# Patient Record
Sex: FEMALE | Race: BLACK OR AFRICAN AMERICAN | NOT HISPANIC OR LATINO | URBAN - METROPOLITAN AREA
[De-identification: names, ages, dates, MRNs, and addresses within clinical notes are randomized per-mention and may not be internally consistent; named-entity substitution may affect disease eponyms.]

---

## 2024-02-28 ENCOUNTER — LAB REQUISITION (OUTPATIENT)
Dept: LAB | Facility: HOSPITAL | Age: 54
End: 2024-02-28
Payer: MEDICAID

## 2024-02-28 PROCEDURE — 88342 IMHCHEM/IMCYTCHM 1ST ANTB: CPT | Performed by: DENTIST

## 2024-02-28 PROCEDURE — 88305 TISSUE EXAM BY PATHOLOGIST: CPT

## 2024-02-28 PROCEDURE — 88305 TISSUE EXAM BY PATHOLOGIST: CPT | Performed by: DENTIST

## 2024-02-28 PROCEDURE — 88342 IMHCHEM/IMCYTCHM 1ST ANTB: CPT

## 2024-03-08 LAB
LAB AP ASR DISCLAIMER: NORMAL
LABORATORY COMMENT REPORT: NORMAL
PATH REPORT.FINAL DX SPEC: NORMAL
PATH REPORT.GROSS SPEC: NORMAL
PATH REPORT.RELEVANT HX SPEC: NORMAL
PATH REPORT.TOTAL CANCER: NORMAL

## 2024-11-12 ENCOUNTER — HOSPITAL ENCOUNTER (EMERGENCY)
Facility: HOSPITAL | Age: 54
Discharge: HOME | End: 2024-11-12
Attending: STUDENT IN AN ORGANIZED HEALTH CARE EDUCATION/TRAINING PROGRAM
Payer: MEDICAID

## 2024-11-12 ENCOUNTER — APPOINTMENT (OUTPATIENT)
Dept: RADIOLOGY | Facility: HOSPITAL | Age: 54
End: 2024-11-12
Payer: MEDICAID

## 2024-11-12 ENCOUNTER — APPOINTMENT (OUTPATIENT)
Dept: CARDIOLOGY | Facility: HOSPITAL | Age: 54
End: 2024-11-12
Payer: MEDICAID

## 2024-11-12 VITALS
HEART RATE: 75 BPM | DIASTOLIC BLOOD PRESSURE: 82 MMHG | SYSTOLIC BLOOD PRESSURE: 135 MMHG | TEMPERATURE: 98.2 F | RESPIRATION RATE: 15 BRPM | OXYGEN SATURATION: 100 %

## 2024-11-12 DIAGNOSIS — R07.9 CHEST PAIN, UNSPECIFIED TYPE: Primary | ICD-10-CM

## 2024-11-12 DIAGNOSIS — M54.2 NECK PAIN: ICD-10-CM

## 2024-11-12 DIAGNOSIS — M79.602 LEFT ARM PAIN: ICD-10-CM

## 2024-11-12 LAB
ANION GAP SERPL CALCULATED.3IONS-SCNC: 12 MMOL/L (ref 10–20)
ATRIAL RATE: 92 BPM
BASOPHILS # BLD AUTO: 0.03 X10*3/UL (ref 0–0.1)
BASOPHILS NFR BLD AUTO: 0.5 %
BUN SERPL-MCNC: 14 MG/DL (ref 6–23)
CALCIUM SERPL-MCNC: 9.6 MG/DL (ref 8.6–10.3)
CARDIAC TROPONIN I PNL SERPL HS: <3 NG/L (ref 0–13)
CARDIAC TROPONIN I PNL SERPL HS: <3 NG/L (ref 0–13)
CHLORIDE SERPL-SCNC: 103 MMOL/L (ref 98–107)
CO2 SERPL-SCNC: 26 MMOL/L (ref 21–32)
CREAT SERPL-MCNC: 0.76 MG/DL (ref 0.5–1.05)
EGFRCR SERPLBLD CKD-EPI 2021: >90 ML/MIN/1.73M*2
EOSINOPHIL # BLD AUTO: 0.09 X10*3/UL (ref 0–0.7)
EOSINOPHIL NFR BLD AUTO: 1.5 %
ERYTHROCYTE [DISTWIDTH] IN BLOOD BY AUTOMATED COUNT: 14.2 % (ref 11.5–14.5)
GIANT PLATELETS BLD QL SMEAR: NORMAL
GLUCOSE SERPL-MCNC: 224 MG/DL (ref 74–99)
HCT VFR BLD AUTO: 39.8 % (ref 36–46)
HGB BLD-MCNC: 13.4 G/DL (ref 12–16)
IMM GRANULOCYTES # BLD AUTO: 0.01 X10*3/UL (ref 0–0.7)
IMM GRANULOCYTES NFR BLD AUTO: 0.2 % (ref 0–0.9)
LYMPHOCYTES # BLD AUTO: 3.01 X10*3/UL (ref 1.2–4.8)
LYMPHOCYTES NFR BLD AUTO: 51.8 %
MCH RBC QN AUTO: 26.9 PG (ref 26–34)
MCHC RBC AUTO-ENTMCNC: 33.7 G/DL (ref 32–36)
MCV RBC AUTO: 80 FL (ref 80–100)
MONOCYTES # BLD AUTO: 0.53 X10*3/UL (ref 0.1–1)
MONOCYTES NFR BLD AUTO: 9.1 %
NEUTROPHILS # BLD AUTO: 2.14 X10*3/UL (ref 1.2–7.7)
NEUTROPHILS NFR BLD AUTO: 36.9 %
NRBC BLD-RTO: 0 /100 WBCS (ref 0–0)
P AXIS: 69 DEGREES
P OFFSET: 212 MS
P ONSET: 158 MS
PLATELET # BLD AUTO: 364 X10*3/UL (ref 150–450)
POTASSIUM SERPL-SCNC: 3.6 MMOL/L (ref 3.5–5.3)
PR INTERVAL: 136 MS
Q ONSET: 226 MS
QRS COUNT: 15 BEATS
QRS DURATION: 72 MS
QT INTERVAL: 352 MS
QTC CALCULATION(BAZETT): 435 MS
QTC FREDERICIA: 405 MS
R AXIS: 12 DEGREES
RBC # BLD AUTO: 4.99 X10*6/UL (ref 4–5.2)
RBC MORPH BLD: NORMAL
SODIUM SERPL-SCNC: 137 MMOL/L (ref 136–145)
T AXIS: 29 DEGREES
T OFFSET: 402 MS
TARGETS BLD QL SMEAR: NORMAL
VENTRICULAR RATE: 92 BPM
WBC # BLD AUTO: 5.8 X10*3/UL (ref 4.4–11.3)

## 2024-11-12 PROCEDURE — 71275 CT ANGIOGRAPHY CHEST: CPT | Performed by: RADIOLOGY

## 2024-11-12 PROCEDURE — 85025 COMPLETE CBC W/AUTO DIFF WBC: CPT | Performed by: STUDENT IN AN ORGANIZED HEALTH CARE EDUCATION/TRAINING PROGRAM

## 2024-11-12 PROCEDURE — 84484 ASSAY OF TROPONIN QUANT: CPT | Performed by: STUDENT IN AN ORGANIZED HEALTH CARE EDUCATION/TRAINING PROGRAM

## 2024-11-12 PROCEDURE — 99285 EMERGENCY DEPT VISIT HI MDM: CPT | Mod: 25

## 2024-11-12 PROCEDURE — 2550000001 HC RX 255 CONTRASTS: Performed by: STUDENT IN AN ORGANIZED HEALTH CARE EDUCATION/TRAINING PROGRAM

## 2024-11-12 PROCEDURE — 80048 BASIC METABOLIC PNL TOTAL CA: CPT | Performed by: STUDENT IN AN ORGANIZED HEALTH CARE EDUCATION/TRAINING PROGRAM

## 2024-11-12 PROCEDURE — 71275 CT ANGIOGRAPHY CHEST: CPT

## 2024-11-12 PROCEDURE — 71046 X-RAY EXAM CHEST 2 VIEWS: CPT

## 2024-11-12 PROCEDURE — 96375 TX/PRO/DX INJ NEW DRUG ADDON: CPT | Mod: 59

## 2024-11-12 PROCEDURE — 71046 X-RAY EXAM CHEST 2 VIEWS: CPT | Performed by: RADIOLOGY

## 2024-11-12 PROCEDURE — 93005 ELECTROCARDIOGRAM TRACING: CPT

## 2024-11-12 PROCEDURE — 2500000004 HC RX 250 GENERAL PHARMACY W/ HCPCS (ALT 636 FOR OP/ED): Performed by: STUDENT IN AN ORGANIZED HEALTH CARE EDUCATION/TRAINING PROGRAM

## 2024-11-12 PROCEDURE — 36415 COLL VENOUS BLD VENIPUNCTURE: CPT | Performed by: STUDENT IN AN ORGANIZED HEALTH CARE EDUCATION/TRAINING PROGRAM

## 2024-11-12 PROCEDURE — 96374 THER/PROPH/DIAG INJ IV PUSH: CPT | Mod: 59

## 2024-11-12 RX ORDER — KETOROLAC TROMETHAMINE 30 MG/ML
15 INJECTION, SOLUTION INTRAMUSCULAR; INTRAVENOUS ONCE
Status: COMPLETED | OUTPATIENT
Start: 2024-11-12 | End: 2024-11-12

## 2024-11-12 RX ORDER — MORPHINE SULFATE 4 MG/ML
4 INJECTION, SOLUTION INTRAMUSCULAR; INTRAVENOUS ONCE
Status: COMPLETED | OUTPATIENT
Start: 2024-11-12 | End: 2024-11-12

## 2024-11-12 RX ORDER — PREDNISONE 20 MG/1
40 TABLET ORAL ONCE
Status: COMPLETED | OUTPATIENT
Start: 2024-11-12 | End: 2024-11-12

## 2024-11-12 RX ORDER — PREDNISONE 20 MG/1
20 TABLET ORAL DAILY
Qty: 3 TABLET | Refills: 0 | Status: SHIPPED | OUTPATIENT
Start: 2024-11-12 | End: 2024-11-15

## 2024-11-12 RX ORDER — ACETAMINOPHEN 500 MG
500 TABLET ORAL EVERY 6 HOURS PRN
COMMUNITY

## 2024-11-12 RX ORDER — BISMUTH SUBSALICYLATE 262 MG
1 TABLET,CHEWABLE ORAL DAILY
COMMUNITY

## 2024-11-12 ASSESSMENT — HEART SCORE
TROPONIN: LESS THAN OR EQUAL TO NORMAL LIMIT
ECG: NORMAL
AGE: 45-64
HISTORY: SLIGHTLY SUSPICIOUS
RISK FACTORS: 1-2 RISK FACTORS
HEART SCORE: 2

## 2024-11-12 ASSESSMENT — PAIN SCALES - GENERAL
PAINLEVEL_OUTOF10: 2
PAINLEVEL_OUTOF10: 8

## 2024-11-12 ASSESSMENT — LIFESTYLE VARIABLES
EVER HAD A DRINK FIRST THING IN THE MORNING TO STEADY YOUR NERVES TO GET RID OF A HANGOVER: NO
HAVE PEOPLE ANNOYED YOU BY CRITICIZING YOUR DRINKING: NO
EVER FELT BAD OR GUILTY ABOUT YOUR DRINKING: NO
HAVE YOU EVER FELT YOU SHOULD CUT DOWN ON YOUR DRINKING: NO
TOTAL SCORE: 0

## 2024-11-12 NOTE — ED NOTES
Patient presents to the ER or complaints of CP, LEFT NECK AND ARM PAIN FOR A FEW DAYS.      PMHX:  No past medical history on file.     Allergies   Allergen Reactions    Banana Unknown    Latex Unknown    Peach Unknown    Pear Unknown    Potassium Swelling    Strawberry Unknown         LABS:     Latest Reference Range & Units 11/12/24 10:56   GLUCOSE 74 - 99 mg/dL 224 (H)   SODIUM 136 - 145 mmol/L 137   POTASSIUM 3.5 - 5.3 mmol/L 3.6   CHLORIDE 98 - 107 mmol/L 103   Bicarbonate 21 - 32 mmol/L 26   Anion Gap 10 - 20 mmol/L 12   Blood Urea Nitrogen 6 - 23 mg/dL 14   Creatinine 0.50 - 1.05 mg/dL 0.76   EGFR >60 mL/min/1.73m*2 >90   Calcium 8.6 - 10.3 mg/dL 9.6   Troponin I, High Sensitivity 0 - 13 ng/L <3   WBC 4.4 - 11.3 x10*3/uL 5.8   nRBC 0.0 - 0.0 /100 WBCs 0.0   RBC 4.00 - 5.20 x10*6/uL 4.99   HEMOGLOBIN 12.0 - 16.0 g/dL 13.4   HEMATOCRIT 36.0 - 46.0 % 39.8   MCV 80 - 100 fL 80   MCH 26.0 - 34.0 pg 26.9   MCHC 32.0 - 36.0 g/dL 33.7   RED CELL DISTRIBUTION WIDTH 11.5 - 14.5 % 14.2   Platelets 150 - 450 x10*3/uL 364   Neutrophils % 40.0 - 80.0 % 36.9   Immature Granulocytes %, Automated 0.0 - 0.9 % 0.2   Lymphocytes % 13.0 - 44.0 % 51.8   Monocytes % 2.0 - 10.0 % 9.1   Eosinophils % 0.0 - 6.0 % 1.5   Basophils % 0.0 - 2.0 % 0.5   Neutrophils Absolute 1.20 - 7.70 x10*3/uL 2.14   Immature Granulocytes Absolute, Automated 0.00 - 0.70 x10*3/uL 0.01   Lymphocytes Absolute 1.20 - 4.80 x10*3/uL 3.01   Monocytes Absolute 0.10 - 1.00 x10*3/uL 0.53   Eosinophils Absolute 0.00 - 0.70 x10*3/uL 0.09   Basophils Absolute 0.00 - 0.10 x10*3/uL 0.03   RBC Morphology  See Below   Target Cells  Few   Giant Platelets  Few   ECG 12-LEAD  Rpt   P Axis degrees 69   WY Interval ms 136   QT Interval ms 352   (H): Data is abnormally high  Rpt: View report in Results Review for more information    PLAN: PENDING                   Dena Roque RN  11/12/24 0842

## 2024-11-12 NOTE — ED TRIAGE NOTES
Pt states she has been having pain in her left arm and neck for a few days. States the pain has become unbearable. EKG done at 1041

## 2024-11-12 NOTE — ED PROVIDER NOTES
HPI   Chief Complaint   Patient presents with    Chest Pain     PATIENT REPORTS CP, LEFT NECK AND ARM PAIN FOR A FEW DAYS.       Patient presents with pain in her left shoulder, chest, neck, and left arm.  She reports that her left hand feels numb.  Symptoms have been ongoing for several days.  It randomly worsens.  She reports that it has awoken her from sleep.  She is not able to identify any pattern.  No injuries or increase utilization of the arm.  Nothing like this is happened previously.  She does have diabetes but is otherwise healthy.  She does not smoke.  She reports that she did have an unprovoked DVT years ago and was on blood thinners for a short time but is no longer on blood thinning medications.              Patient History   No past medical history on file.  No past surgical history on file.  No family history on file.  Social History     Tobacco Use    Smoking status: Not on file    Smokeless tobacco: Not on file   Substance Use Topics    Alcohol use: Not on file    Drug use: Not on file       Physical Exam   ED Triage Vitals [11/12/24 1100]   Temperature Heart Rate Respirations BP   36.8 °C (98.2 °F) 90 15 (!) 108/94      Pulse Ox Temp src Heart Rate Source Patient Position   95 % -- -- --      BP Location FiO2 (%)     -- --       Physical Exam  HENT:      Head: Normocephalic.   Cardiovascular:      Rate and Rhythm: Normal rate.      Comments: 2+ left radial pulse, regular.  Pulmonary:      Breath sounds: Normal breath sounds.   Musculoskeletal:      Comments: No pretibial edema.   Neurological:      Mental Status: She is alert.           ED Course & MDM   ED Course as of 11/12/24 1134   Tue Nov 12, 2024   1045 EKG interpreted by me: Normal sinus rhythm, rate 92.  Normal axis.  No ST or T wave abnormalities. [ML]      ED Course User Index  [ML] Javon Mitchell MD         Diagnoses as of 11/12/24 1134   Chest pain, unspecified type   Left arm pain   Neck pain                 No data recorded                                  Medical Decision Making  In setting of previous DVT not currently on anticoagulation, CT angio of the chest was obtained to evaluate for PE.  This was negative.  Troponin x 2 is unremarkable.  Remainder of laboratory studies are unremarkable.  Patient is felt to be at low risk for major adverse cardiac event in the next 6 weeks by heart score.  Aortic dissection, esophageal perforation, pneumothorax felt to be very unlikely.  Patient reports symptomatic improvement following analgesics and steroid.  Patient given prescription for prednisone and was advised to follow-up with primary care physician.  Return precautions given for any worsening symptoms.  Parts of this chart were completed with dictation software, please excuse any errors in transcription.        Procedure  Procedures     Javon Mitchell MD  11/12/24 4665

## 2024-11-12 NOTE — PROGRESS NOTES
Pharmacy Medication History Review    Debo Tom is a 54 y.o. female. Pharmacy reviewed the patient's [id number] medications and allergies for accuracy.    Medications ADDED:  Acetaminophen 500mg  Prenatal  Flax seed oil  Zuleima/turmeric    Medications CHANGED:  none  Medications REMOVED:   none     The list below reflects the updated PTA list. Comments regarding how patient may be taking medications differently can be found in the Admit Orders Activity  Prior to Admission Medications   Prescriptions Last Dose Informant   FLAXSEED OIL ORAL 11/12/2024 Self   Sig: Take 1 each by mouth once daily.   TURMERIC ROOT-ZULEIMA ROOT EXT ORAL 11/12/2024 Self   Sig: Take 1 each by mouth once daily.   acetaminophen (Tylenol) 500 mg tablet 11/11/2024 Self   Sig: Take 1 tablet (500 mg) by mouth every 6 hours if needed for mild pain (1 - 3).   multivitamin tablet 11/12/2024 Self   Sig: Take 1 tablet by mouth once daily.   prenatal vit 93/iron fum/folic (PRENATAL FORMULA ORAL) 11/12/2024 Self   Sig: Take 1 tablet by mouth once daily.      Facility-Administered Medications: None        The list below reflects the updated allergy list. Please review each documented allergy for additional clarification and justification.  Allergies  Reviewed by Odessa Badillo CPhT on 11/12/2024        Severity Reactions Comments    Banana Not Specified Unknown     Latex Not Specified Unknown     Peach Not Specified Unknown     Pear Not Specified Unknown     Potassium Not Specified Swelling     Strawberry Not Specified Unknown             Pharmacy has been updated to [facility].    Sources used to complete the med history include dispense history, PTA medication list, patient interview. Patient is a fair historian.    Below are additional concerns with the patient's PTA list.  Patient reports having been prescribed prescription medications for diabetes. Patient self stopped months ago.    Odessa Badillo CPhT-Adv  Please reach  out via [employer] Secure Chat for questions

## 2024-11-12 NOTE — DISCHARGE INSTRUCTIONS
Your CAT scan and laboratory studies were normal.  You should follow-up with your primary care physician.  You have been given a prescription for steroid to use and you may also use Tylenol and ibuprofen as needed for pain.    It is important to remember that your care does not end here and you must continue to monitor your condition closely. Please return to the emergency department for any worsening or concerning signs or symptoms as directed by our conversations and the discharge instructions. If you do not have a doctor please contact the referral number on your discharge instructions. Please contact any physician specialists provided in your discharge notes as it is very important to follow up with them regarding your condition. If you are unable to reach the physicians provided, please come back to the Emergency Department at any time.    Return to emergency room without delay for ANY new or worsening pains or for any other symptoms or concerns.  Return with worsening pains, nausea, vomiting, trouble breathing, palpitations, shortness of breath, inability to pass stool or urine, loss of control of stool or urine, any numbness or tingling (that is not normal for you), uncontrolled fevers, the passing of blood or other material in stool or urine, rashes, pains or for any other symptoms or concerns you may have.  You are always welcome to return to the ER at any time for any reason or for any other concerns you may have.

## 2025-06-09 ENCOUNTER — HOSPITAL ENCOUNTER (EMERGENCY)
Facility: HOSPITAL | Age: 55
Discharge: HOME | End: 2025-06-09
Attending: EMERGENCY MEDICINE
Payer: MEDICAID

## 2025-06-09 ENCOUNTER — APPOINTMENT (OUTPATIENT)
Dept: RADIOLOGY | Facility: HOSPITAL | Age: 55
End: 2025-06-09
Payer: MEDICAID

## 2025-06-09 ENCOUNTER — CLINICAL SUPPORT (OUTPATIENT)
Dept: EMERGENCY MEDICINE | Facility: HOSPITAL | Age: 55
End: 2025-06-09
Payer: MEDICAID

## 2025-06-09 VITALS
HEART RATE: 92 BPM | SYSTOLIC BLOOD PRESSURE: 110 MMHG | RESPIRATION RATE: 18 BRPM | WEIGHT: 180 LBS | BODY MASS INDEX: 28.93 KG/M2 | OXYGEN SATURATION: 97 % | HEIGHT: 66 IN | TEMPERATURE: 96.1 F | DIASTOLIC BLOOD PRESSURE: 69 MMHG

## 2025-06-09 DIAGNOSIS — R41.82 ALTERED MENTAL STATUS, UNSPECIFIED ALTERED MENTAL STATUS TYPE: Primary | ICD-10-CM

## 2025-06-09 LAB
ALBUMIN SERPL BCP-MCNC: 4.3 G/DL (ref 3.4–5)
ALP SERPL-CCNC: 113 U/L (ref 33–110)
ALT SERPL W P-5'-P-CCNC: 24 U/L (ref 7–45)
ANION GAP BLDV CALCULATED.4IONS-SCNC: 8 MMOL/L (ref 10–25)
ANION GAP SERPL CALC-SCNC: 12 MMOL/L (ref 10–20)
APAP SERPL-MCNC: <10 UG/ML (ref ?–30)
APTT PPP: 39 SECONDS (ref 26–36)
AST SERPL W P-5'-P-CCNC: 18 U/L (ref 9–39)
ATRIAL RATE: 106 BPM
B-HCG SERPL-ACNC: <3 MIU/ML
BASE EXCESS BLDV CALC-SCNC: 3.7 MMOL/L (ref -2–3)
BASOPHILS # BLD AUTO: 0.05 X10*3/UL (ref 0–0.1)
BASOPHILS NFR BLD AUTO: 0.5 %
BILIRUB SERPL-MCNC: 0.7 MG/DL (ref 0–1.2)
BODY TEMPERATURE: 37 DEGREES CELSIUS
BUN SERPL-MCNC: 8 MG/DL (ref 6–23)
CA-I BLDV-SCNC: 1.15 MMOL/L (ref 1.1–1.33)
CALCIUM SERPL-MCNC: 9.4 MG/DL (ref 8.6–10.6)
CARDIAC TROPONIN I PNL SERPL HS: 4 NG/L (ref 0–34)
CHLORIDE BLDV-SCNC: 106 MMOL/L (ref 98–107)
CHLORIDE SERPL-SCNC: 104 MMOL/L (ref 98–107)
CO2 SERPL-SCNC: 26 MMOL/L (ref 21–32)
CREAT SERPL-MCNC: 0.65 MG/DL (ref 0.5–1.05)
EGFRCR SERPLBLD CKD-EPI 2021: >90 ML/MIN/1.73M*2
EOSINOPHIL # BLD AUTO: 0.11 X10*3/UL (ref 0–0.7)
EOSINOPHIL NFR BLD AUTO: 1.1 %
ERYTHROCYTE [DISTWIDTH] IN BLOOD BY AUTOMATED COUNT: 13.4 % (ref 11.5–14.5)
ETHANOL SERPL-MCNC: <10 MG/DL
GLUCOSE BLD MANUAL STRIP-MCNC: 220 MG/DL (ref 74–99)
GLUCOSE BLDV-MCNC: 245 MG/DL (ref 74–99)
GLUCOSE SERPL-MCNC: 232 MG/DL (ref 74–99)
HCO3 BLDV-SCNC: 27.7 MMOL/L (ref 22–26)
HCT VFR BLD AUTO: 34.5 % (ref 36–46)
HCT VFR BLD EST: 38 % (ref 36–46)
HGB BLD-MCNC: 12 G/DL (ref 12–16)
HGB BLDV-MCNC: 12.7 G/DL (ref 12–16)
IMM GRANULOCYTES # BLD AUTO: 0.08 X10*3/UL (ref 0–0.7)
IMM GRANULOCYTES NFR BLD AUTO: 0.8 % (ref 0–0.9)
INR PPP: 1 (ref 0.9–1.1)
LACTATE BLDV-SCNC: 0.8 MMOL/L (ref 0.4–2)
LYMPHOCYTES # BLD AUTO: 2.96 X10*3/UL (ref 1.2–4.8)
LYMPHOCYTES NFR BLD AUTO: 30.2 %
MAGNESIUM SERPL-MCNC: 1.81 MG/DL (ref 1.6–2.4)
MCH RBC QN AUTO: 26.7 PG (ref 26–34)
MCHC RBC AUTO-ENTMCNC: 34.8 G/DL (ref 32–36)
MCV RBC AUTO: 77 FL (ref 80–100)
MONOCYTES # BLD AUTO: 1.1 X10*3/UL (ref 0.1–1)
MONOCYTES NFR BLD AUTO: 11.2 %
NEUTROPHILS # BLD AUTO: 5.51 X10*3/UL (ref 1.2–7.7)
NEUTROPHILS NFR BLD AUTO: 56.2 %
NRBC BLD-RTO: 0 /100 WBCS (ref 0–0)
OXYHGB MFR BLDV: 73.7 % (ref 45–75)
P AXIS: 68 DEGREES
P OFFSET: 211 MS
P ONSET: 154 MS
PCO2 BLDV: 39 MM HG (ref 41–51)
PH BLDV: 7.46 PH (ref 7.33–7.43)
PLATELET # BLD AUTO: 335 X10*3/UL (ref 150–450)
PO2 BLDV: 44 MM HG (ref 35–45)
POTASSIUM BLDV-SCNC: 3.3 MMOL/L (ref 3.5–5.3)
POTASSIUM SERPL-SCNC: 3.2 MMOL/L (ref 3.5–5.3)
PR INTERVAL: 144 MS
PROT SERPL-MCNC: 7.7 G/DL (ref 6.4–8.2)
PROTHROMBIN TIME: 11.1 SECONDS (ref 9.8–12.4)
Q ONSET: 226 MS
QRS COUNT: 18 BEATS
QRS DURATION: 74 MS
QT INTERVAL: 342 MS
QTC CALCULATION(BAZETT): 454 MS
QTC FREDERICIA: 413 MS
R AXIS: 3 DEGREES
RBC # BLD AUTO: 4.5 X10*6/UL (ref 4–5.2)
SALICYLATES SERPL-MCNC: <3 MG/DL (ref ?–20)
SAO2 % BLDV: 75 % (ref 45–75)
SODIUM BLDV-SCNC: 138 MMOL/L (ref 136–145)
SODIUM SERPL-SCNC: 139 MMOL/L (ref 136–145)
T AXIS: 4 DEGREES
T OFFSET: 397 MS
VENTRICULAR RATE: 106 BPM
WBC # BLD AUTO: 9.8 X10*3/UL (ref 4.4–11.3)

## 2025-06-09 PROCEDURE — 82947 ASSAY GLUCOSE BLOOD QUANT: CPT | Mod: 59

## 2025-06-09 PROCEDURE — 70498 CT ANGIOGRAPHY NECK: CPT

## 2025-06-09 PROCEDURE — 84702 CHORIONIC GONADOTROPIN TEST: CPT

## 2025-06-09 PROCEDURE — 93005 ELECTROCARDIOGRAM TRACING: CPT

## 2025-06-09 PROCEDURE — 83735 ASSAY OF MAGNESIUM: CPT

## 2025-06-09 PROCEDURE — 84132 ASSAY OF SERUM POTASSIUM: CPT

## 2025-06-09 PROCEDURE — 99285 EMERGENCY DEPT VISIT HI MDM: CPT | Mod: 25 | Performed by: EMERGENCY MEDICINE

## 2025-06-09 PROCEDURE — 85610 PROTHROMBIN TIME: CPT

## 2025-06-09 PROCEDURE — 85025 COMPLETE CBC W/AUTO DIFF WBC: CPT

## 2025-06-09 PROCEDURE — 70496 CT ANGIOGRAPHY HEAD: CPT | Performed by: RADIOLOGY

## 2025-06-09 PROCEDURE — 93010 ELECTROCARDIOGRAM REPORT: CPT | Performed by: EMERGENCY MEDICINE

## 2025-06-09 PROCEDURE — 36415 COLL VENOUS BLD VENIPUNCTURE: CPT

## 2025-06-09 PROCEDURE — 2550000001 HC RX 255 CONTRASTS: Mod: SE

## 2025-06-09 PROCEDURE — 85730 THROMBOPLASTIN TIME PARTIAL: CPT

## 2025-06-09 PROCEDURE — 84484 ASSAY OF TROPONIN QUANT: CPT

## 2025-06-09 PROCEDURE — 70450 CT HEAD/BRAIN W/O DYE: CPT

## 2025-06-09 PROCEDURE — 70498 CT ANGIOGRAPHY NECK: CPT | Performed by: RADIOLOGY

## 2025-06-09 PROCEDURE — 70450 CT HEAD/BRAIN W/O DYE: CPT | Performed by: RADIOLOGY

## 2025-06-09 PROCEDURE — 99285 EMERGENCY DEPT VISIT HI MDM: CPT | Performed by: EMERGENCY MEDICINE

## 2025-06-09 PROCEDURE — 80143 DRUG ASSAY ACETAMINOPHEN: CPT

## 2025-06-09 RX ADMIN — IOHEXOL 90 ML: 350 INJECTION, SOLUTION INTRAVENOUS at 00:51

## 2025-06-09 ASSESSMENT — PAIN - FUNCTIONAL ASSESSMENT: PAIN_FUNCTIONAL_ASSESSMENT: 0-10

## 2025-06-09 ASSESSMENT — PAIN DESCRIPTION - DESCRIPTORS: DESCRIPTORS: ACHING

## 2025-06-09 ASSESSMENT — PAIN SCALES - GENERAL: PAINLEVEL_OUTOF10: 6

## 2025-06-09 ASSESSMENT — PAIN DESCRIPTION - LOCATION: LOCATION: HEAD

## 2025-06-09 ASSESSMENT — PAIN DESCRIPTION - PAIN TYPE: TYPE: ACUTE PAIN

## 2025-06-09 ASSESSMENT — PAIN DESCRIPTION - PROGRESSION: CLINICAL_PROGRESSION: NOT CHANGED

## 2025-06-09 NOTE — DISCHARGE INSTRUCTIONS
Please follow-up with your primary care physician within the next 3 to 5 days to discuss your ED visit.  Please return to the emergency department if you develop worsening chest pain, shortness of breath, fevers, chills, numbness, tingling.    Please follow up with neurology for your symptoms  --   Neurology Clinic  [facility]  Phone: [phone]

## 2025-06-09 NOTE — ED TRIAGE NOTES
Pt was brought in by [facility] with stroke like symptoms. Per EMS the pt told her family that she had a headache and said to tell people that she loves them then she became unresponsive. Last known well was 0015. Pt has a hx of a brain tumor and pseudoseizures.

## 2025-06-09 NOTE — SIGNIFICANT EVENT
Debo Tom is a 54 y.o. female on day 0 of admission presenting with No Principal Problem: There is no principal problem currently on the Problem List. Please update the Problem List and refresh.    - BAT was called at 12:45AM for non-verbal after headache   - LKN: 12:15AM  - On arrival, /70 POCG 220    NIHSS    1a  Level of consciousness: 0=alert; keenly responsive   1b. LOC questions:  2=Performs neither task correctly   1c. LOC commands: 2=Performs neither task correctly   2.  Best Gaze: 0=normal   3. Visual: 0=No visual loss   4. Facial Palsy: 0=Normal symmetric movement   5a. Motor Left Arm: 4=No movement   5b.  Motor Right Arm: 4=No movement   6a. Motor Left Le=No movement   6b  Motor Right Le=No movement   7. Limb Ataxia: 0=Absent   8.  Sensory: 0=Normal; no sensory loss   9. Best Language:  3=Mute, global aphasia; no usable speech or auditory comprehension   10. Dysarthria: 2=Severe; patient speech is so slurred as to be unintelligible in the absence of or our of proportion to any dysphagia, or is mute/anarthric   11. Extinction and Inattention: 0=No abnormality          Total:   25      History received from ED resident, the patient was complaining of severe headache then states goodbye to her family.  Afterwards, she stopped moving, and became nonverbal.  About was call for being nonverbal.    On physical exam she is able to bring to threat in all quadrants.  Performed CT head, per personal review, no acute abnormalities.  CTA head and neck, per personal review, no abnormalities.    - Not a candidate for TNK/MT for low suspicion for stroke  - Discussed with ED resident, Dr Guy who agreed with cancellation of BAT     - BAT canceled at 1:10 AM.    The case has been discussed with the senior resident, Dr. Lane.    Georges Montilla MD PhD  Neurology resident, PGY-2

## 2025-06-09 NOTE — Clinical Note
Debo Tom was seen and treated in our emergency department on 6/9/2025.  She may return to work on 06/10/2025.       If you have any questions or concerns, please don't hesitate to call.      Jessica Guy, DO

## 2025-06-09 NOTE — ED PROVIDER NOTES
"CC: Stroke     History provided by: EMS  Limitations to History: Patient Acuity    HPI:  Patient is a 54-year-old female with history of diabetes who presents as a brain attack.  Patient is aphasic therefore unable to provide a history.  History obtained from EMS.  They state patient's last known well was approximately 0015.  They were called to the house by family who states patient stated she had a severe headache then said \"tell my loved ones goodbye.\"  She then became unresponsive.  She has not been speaking to EMS and has not been speaking during my evaluation.  Her head is initially turned to the right side however no obvious neglect and is able to turn her head.  Opens eyes spontaneously.  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  No signs of overt trauma or endorsement of trauma.  Does not appear to be on blood thinners. No endorsement of seizures per family. Unable to find chart history of seizures, PNES or brain mass as reported by EMS.    External Records Reviewed:  I reviewed prior ED visits, Care Everywhere, discharge summaries and outpatient records as appropriate.   ???????????????????????????????????????????????????????????????  Triage Vitals:  T 37.5 °C (99.5 °F)  HR (!) 105  /81  RR 13  O2 95 % None (Room air)    Physical Exam  HENT:      Head: Normocephalic and atraumatic.   Eyes:      Conjunctiva/sclera: Conjunctivae normal.      Comments: Pupils 2mm reactive   Cardiovascular:      Rate and Rhythm: Normal rate and regular rhythm.   Pulmonary:      Effort: Pulmonary effort is normal.      Breath sounds: Normal breath sounds.   Abdominal:      General: Abdomen is flat.      Palpations: Abdomen is soft.   Skin:     General: Skin is warm.   Neurological:      Mental Status: She is alert.      Comments: - LKW: 0015      - 1a. Level of Consciousness: 0/3      - 1b. LOC Questions: 2/2      - 1c. LOC Commands: 2/2      - 2. Best Gaze: 0/2      - 3. " Visual Fields: 0/3      - 4. Facial Palsy: 0/3      - 5a. Motor Arm (Left): 4/4      - 5b. Motor Arm (Right): 4/4      - 6a. Motor Leg (Left): 4/4      - 6b. Motor Leg (Right): 4/4      - 7. Limb Ataxia: 0/2      - 8. Sensory: 0/2      - 9. Best Language: 3/3      - 10. Dysarthria: 2/2      - 11. Extinction/Neglect: 0/2      - TOTAL: 25/42      - VAN: positive      - TIME: 0052          ???????????????????????????????????????????????????????????????  ED Course/Treatment/Medical Decision Making  MDM:  Patient is a 54-year-old female who presents as a brain attack.  Blood sugar within normal limits.  Vital signs overall within normal limits.  Patient is Van positive, initial NIH is 25.  She is aphasic, flaccid in all extremities no obvious facial droop.  Not following commands.  No signs of overt trauma on examination, pupils are equally reactive.  Neurostroke team is at bedside.  CT head reviewed without obvious intracranial bleed. In CT, eyes are open she is staring blankly ahead, does not appear comatose, has intermittent shakes of her body, low suspicion for posterior circulation stroke based on presentation.  After review of CT imaging, brain attack canceled by neurostroke team.  Will defer TNK administration given multiple other differentials may be contributing.  They include psychogenic seizures, functional neurologic disorder, catatonia.  Given her headache began 30 minutes prior to arrival, CT imaging is highly sensitive and I do not suspect a subarachnoid bleed that is not visualized on imaging.  There is no endorsement of seizure-like activity by family therefore I do not suspect seizure disorder.  Will continue to monitor patient and evaluate for return to baseline.  Will attempt to obtain collateral from family.  Of note, patient is not responsive to sternal rub however breathing spontaneously and stable on room air, protecting her airway at this time, opening eyes spontaneously, when flushing her eyes  "with water she is responsive.      ED Course:  ED Course as of 06/09/25 0530   Mon Jun 09, 2025   0100 Attempted to call listed child in EMR however unable to call and number listed as \"restricted\" [SA]   0101 Venous full panel reviewed with pH 7.46, pCO2 39, lactate 0.8, glucose 245 [SA]   0109 Per chart review, from [facility] has history of intractable migraine which may be contributing [SA]   0113 EKG reviewed sinus tachycardia rate 106, OH interval 144 ms, QRS 74 ms, QTc 454 ms, no acute ST segment elevations or depressions, normal axis, [SA]   0317 Tox panel is negative, CBC overall within normal limits, CMP with potassium 3.2, creatinine within normal limits, alk phos slightly elevated at 113, patient has an allergy listed to potassium therefore will not replete, will obtain magnesium level [SA]   0319 Troponin is 4 [SA]   0319 CT brain attack angio head and neck W and WO IV contrast  IMPRESSION:  1. No CTA evidence of hemodynamically significant stenosis or large  vessel occlusion.  2. Additional findings as described above.   [SA]   0319 CT brain attack head wo IV contrast  IMPRESSION:  1.  No acute intracranial hemorrhage, mass effect or midline shift.  2. Partial opacification of the left maxillary sinus and trace  opacification of the ethmoid air cells.       [SA]   0358 Patient is awake, moving all extremities, speaking in full sentences, requesting to leave, will ambulate patient prior to discharge [SA]   0530 Patient and sons at bedside updated on workup results and patient agreeable with discharge and neurology follow up and PCP follow up which was given [SA]      ED Course User Index  [SA] Jessica Guy DO         Diagnoses as of 06/09/25 0530   Altered mental status, unspecified altered mental status type         EKG Interpretation:  See ED Course/Below:    Independent Interpretation of Studies:  I independently interpreted labs/imaging as stated in ED Course or below.    Differential diagnoses " considered include but are not limited to: See MDM/Below:    Social Determinants Limiting Care:  None identified The following actions were taken to address these social determinants:      Discussion of Management with Other Providers: See MDM/Below:    Disposition:  Discussed differential and workup results including pertinent labs/imaging and any incidental findings if applicable. Patient will follow-up with the primary physician in the next 2-3 days. Return if worse. They understand return precautions and discharge instructions. They were given the opportunity to ask any questions. All of their questions were answered. Patient and family/friend/caregiver are in agreement with this plan.       CASIMIRO Loo, PGY-3    I reviewed the case with the attending ED physician. The attending ED physician agrees with the plan. Patient and/or patient´s representative was counseled regarding labs, imaging, likely diagnosis, and plan. All questions were answered.    Disclaimer: This note was dictated by speech recognition.  Attempt at proofreading was made to minimize errors.  Errors in transcription may be present.  Please call if questions.    Procedures ? [employer] last updated 6/9/2025 5:30 AM           Jessica Guy DO  Resident  06/09/25 0530